# Patient Record
Sex: MALE | Race: WHITE | NOT HISPANIC OR LATINO | URBAN - METROPOLITAN AREA
[De-identification: names, ages, dates, MRNs, and addresses within clinical notes are randomized per-mention and may not be internally consistent; named-entity substitution may affect disease eponyms.]

---

## 2022-11-16 ENCOUNTER — OFFICE VISIT (OUTPATIENT)
Dept: PODIATRY | Facility: CLINIC | Age: 32
End: 2022-11-16
Payer: OTHER GOVERNMENT

## 2022-11-16 ENCOUNTER — HOSPITAL ENCOUNTER (OUTPATIENT)
Dept: RADIOLOGY | Facility: HOSPITAL | Age: 32
Discharge: HOME | End: 2022-11-16
Attending: PODIATRIST
Payer: OTHER GOVERNMENT

## 2022-11-16 VITALS
HEART RATE: 112 BPM | DIASTOLIC BLOOD PRESSURE: 77 MMHG | TEMPERATURE: 98 F | WEIGHT: 250 LBS | HEIGHT: 77 IN | BODY MASS INDEX: 29.52 KG/M2 | SYSTOLIC BLOOD PRESSURE: 133 MMHG

## 2022-11-16 DIAGNOSIS — S86.019A RUPTURE OF ACHILLES TENDON, UNSPECIFIED LATERALITY, INITIAL ENCOUNTER: ICD-10-CM

## 2022-11-16 DIAGNOSIS — M77.30 CALCANEAL SPUR, UNSPECIFIED LATERALITY: ICD-10-CM

## 2022-11-16 DIAGNOSIS — M76.62 ACHILLES TENDINITIS OF BOTH LOWER EXTREMITIES: ICD-10-CM

## 2022-11-16 DIAGNOSIS — M76.61 ACHILLES TENDINITIS OF BOTH LOWER EXTREMITIES: ICD-10-CM

## 2022-11-16 DIAGNOSIS — M77.30 CALCANEAL SPUR, UNSPECIFIED LATERALITY: Primary | ICD-10-CM

## 2022-11-16 PROCEDURE — 99204 PR OFFICE/OUTPT VISIT, NEW, LEVL IV, 45-59 MIN: ICD-10-PCS | Mod: S$PBB,,, | Performed by: PODIATRIST

## 2022-11-16 PROCEDURE — 73630 X-RAY EXAM OF FOOT: CPT | Mod: 26,50,, | Performed by: RADIOLOGY

## 2022-11-16 PROCEDURE — 99204 OFFICE O/P NEW MOD 45 MIN: CPT | Mod: S$PBB,,, | Performed by: PODIATRIST

## 2022-11-16 PROCEDURE — 73630 X-RAY EXAM OF FOOT: CPT | Mod: TC,50

## 2022-11-16 PROCEDURE — 99999 PR PBB SHADOW E&M-NEW PATIENT-LVL III: ICD-10-PCS | Mod: PBBFAC,,, | Performed by: PODIATRIST

## 2022-11-16 PROCEDURE — 99203 OFFICE O/P NEW LOW 30 MIN: CPT | Mod: PBBFAC | Performed by: PODIATRIST

## 2022-11-16 PROCEDURE — 73630 XR FOOT COMPLETE 3 VIEW BILATERAL: ICD-10-PCS | Mod: 26,50,, | Performed by: RADIOLOGY

## 2022-11-16 PROCEDURE — 99999 PR PBB SHADOW E&M-NEW PATIENT-LVL III: CPT | Mod: PBBFAC,,, | Performed by: PODIATRIST

## 2022-11-19 PROBLEM — M76.62 ACHILLES TENDINITIS OF BOTH LOWER EXTREMITIES: Status: ACTIVE | Noted: 2022-11-19

## 2022-11-19 PROBLEM — M76.61 ACHILLES TENDINITIS OF BOTH LOWER EXTREMITIES: Status: ACTIVE | Noted: 2022-11-19

## 2022-11-19 PROBLEM — S86.019A: Status: ACTIVE | Noted: 2022-11-19

## 2022-11-20 NOTE — PROGRESS NOTES
"Subjective:       Patient ID: Yvgeniconnie Lion is a 32 y.o. male.    Chief Complaint: Heel Pain  Patient presents today for a new patient evaluation he presents complaining of a knot on the back of both of his heels that has gotten progressively worse he states this has been going on for about 2 years his right foot is equally as painful at his left he states the pain sometimes is on and off right now it is not bad but he states certain shoes that he wears or boots that he wears irritate the area at some point he is actually had some bleeding and blisters form on the backs of his heels he states whenever he is not at work he is either barefoot or wearing flip-flops to avoid any shoes that rub or irritate his heels.  Patient has had previous x-rays of his heels but did not have them with him today.  Patient was in the  he is required to wear boots daily he also has to participate in physical testing including running which he states causes a lot of discomfort.    History reviewed. No pertinent past medical history.  Past Surgical History:   Procedure Laterality Date    APPENDECTOMY      MOUTH SURGERY       Family History   Problem Relation Age of Onset    Cancer Maternal Grandmother      Social History     Socioeconomic History    Marital status: Single   Tobacco Use    Smoking status: Never     Passive exposure: Never    Smokeless tobacco: Never   Substance and Sexual Activity    Alcohol use: Not Currently    Drug use: Never    Sexual activity: Yes       No current outpatient medications on file.     No current facility-administered medications for this visit.     Review of patient's allergies indicates:  No Known Allergies    Review of Systems   Musculoskeletal:  Positive for arthralgias and gait problem.   All other systems reviewed and are negative.    Objective:      Vitals:    11/16/22 1500   BP: 133/77   Pulse: (!) 112   Temp: 97.6 °F (36.4 °C)   Weight: 113.4 kg (250 lb)   Height: 6' 5" (1.956 m) "     Physical Exam  Vitals and nursing note reviewed.   Constitutional:       Appearance: Normal appearance.   Cardiovascular:      Pulses:           Dorsalis pedis pulses are 2+ on the right side and 2+ on the left side.        Posterior tibial pulses are 2+ on the right side and 2+ on the left side.   Pulmonary:      Effort: Pulmonary effort is normal.   Musculoskeletal:         General: Swelling, tenderness and deformity present.      Right foot: Deformity present.      Left foot: Deformity present.        Feet:    Feet:      Right foot:      Protective Sensation: 2 sites tested.  2 sites sensed.      Skin integrity: Erythema and warmth present.      Left foot:      Protective Sensation: 2 sites tested.  2 sites sensed.      Skin integrity: Erythema and warmth present.   Skin:     Capillary Refill: Capillary refill takes less than 2 seconds.      Findings: Erythema present.   Neurological:      General: No focal deficit present.      Mental Status: He is alert.   Psychiatric:         Mood and Affect: Mood normal.         Behavior: Behavior normal.                          Assessment:       1. Calcaneal spur, unspecified laterality    2. Achilles tendinitis of both lower extremities    3. Rupture of Achilles tendon, unspecified laterality, initial encounter          Plan:       Patient presents today for a new patient evaluation he presents complaining of a knot on the back of both of his heels that has gotten progressively worse he states this has been going on for about 2 years his right foot is equally as painful at his left he states the pain sometimes is on and off right now it is not bad but he states certain shoes that he wears or boots that he wears irritate the area at some point he is actually had some bleeding and blisters form on the backs of his heels he states whenever he is not at work he is either barefoot or wearing flip-flops to avoid any shoes that rub or irritate his heels.  Patient has had  previous x-rays of his heels but did not have them with him today.  Patient was in the  he is required to wear boots daily he also has to participate in physical testing including running which he states causes a lot of discomfort.  A comprehensive new patient evaluation was performed today patient does have significant spurring on the posterior aspect of the calcaneus at the Achilles tendon insertion bilateral there is a ridge of spur that extends all the way around the backs of both heels and at the posterior calcaneus bilateral.  Patient does have inflammation and irritation in this area his pain is limited to the insertion of the Achilles tendon and associated spurs he does not have pain or discomfort along the course of the Achilles tendon above the insertion has good range of motion with no limitation and no pain on range of motion.  Following discussion of the x-rays I explained to the patient that the only appropriate way to remove the spurs is to actually lift the Achilles tendon off the insertion remove and shave the spurs down recontouring the heel reattaching the Achilles tendon to its new insertion point short of that the spurs can not be adequately permanently removed from the posterior aspect of the heel.  I did discuss surgery at length and in detail with the patient he understands require 6 weeks of nonweightbearing followed by 2 weeks of weight-bearing in a fracture boot before gradual return to activity I did explain to the patient this is a permanent procedure and it is done on an outpatient basis.  Patient is going to consider surgery discussed we had tentatively discussed possible surgery for January 27, 2023 patient is going to check with work and contact us when he is ready to pursue surgery.  Patient indicated he would like to do the left foot 1st I have advised the patient to go on the Internet and look up the [employer] tendon repair system this is the system that would  be used to repair the Achilles tendon after removal of the spur.  Follow-up as needed.This note was created using [employer] voice recognition software that occasionally misinterpreted phrases or words.

## 2022-12-16 ENCOUNTER — TELEPHONE (OUTPATIENT)
Dept: PODIATRY | Facility: CLINIC | Age: 32
End: 2022-12-16
Payer: OTHER GOVERNMENT

## 2022-12-16 NOTE — TELEPHONE ENCOUNTER
Patient would like to pursue surgery that was discussed as soon as possible. He needs something in writing about recovery time, walking restrictions to send to [facility]. [facility] is wanting to send him to be stationed in [place], but he needs something saying that yes he needs to have this surgery first and how long it will take for him to recover as well as estimated physical therapy time. Please advise.       PCM is Dr. Perry Fax [phone]

## 2022-12-20 ENCOUNTER — TELEPHONE (OUTPATIENT)
Dept: PODIATRY | Facility: CLINIC | Age: 32
End: 2022-12-20
Payer: OTHER GOVERNMENT

## 2022-12-20 NOTE — TELEPHONE ENCOUNTER
Patient must be approved before he can actually schedule surgery. Patient is aware that prolonging the scheduling that the surgery date will be later.